# Patient Record
Sex: MALE | Race: WHITE | Employment: OTHER | URBAN - NONMETROPOLITAN AREA
[De-identification: names, ages, dates, MRNs, and addresses within clinical notes are randomized per-mention and may not be internally consistent; named-entity substitution may affect disease eponyms.]

---

## 2020-03-13 NOTE — PROGRESS NOTES
HPI:    Patient is a 77 y.o. male in no acute distress. He is alert and oriented to person, place, and time. Patient is here today for bilateral flank pain. Patient does have a history of prostatitis. It has been a number of years since he has been treated for prostatitis. He currently does not take any medications to help him void. Patient has no recent gross hematuria. Patient reports no nausea or vomiting. No past medical history on file. No past surgical history on file. No outpatient encounter medications on file as of 3/16/2020. No facility-administered encounter medications on file as of 3/16/2020. No current outpatient medications on file prior to visit. No current facility-administered medications on file prior to visit. Patient has no allergy information on record. No family history on file. Social History     Tobacco Use   Smoking Status Not on file       Social History     Substance and Sexual Activity   Alcohol Use Not on file       Review of Systems   Constitutional: Negative for appetite change, chills and fever. Eyes: Negative for pain, redness and visual disturbance. Respiratory: Negative for cough, shortness of breath and wheezing. Cardiovascular: Negative for chest pain and leg swelling. Gastrointestinal: Negative for abdominal pain, nausea and vomiting. Genitourinary: Negative for difficulty urinating, discharge, dysuria, flank pain, frequency, hematuria, scrotal swelling and testicular pain. Musculoskeletal: Negative for back pain, joint swelling and myalgias. Skin: Negative for color change, rash and wound. Neurological: Negative for dizziness, tremors and numbness. Hematological: Negative for adenopathy. Does not bruise/bleed easily. There were no vitals taken for this visit. PHYSICAL EXAM:  Constitutional: Patient in no acute distress; Neuro: alert and oriented to person place and time.     Psych: Mood and affect normal.  Skin: Normal  Lungs: Respiratory effort normal  Cardiovascular:  Normal peripheral pulses  Abdomen: Soft, non-tender, non-distended with no CVA, flank pain, hepatosplenomegaly or hernia. Kidneys normal.  Bladder non-tender and not distended. Lymphatics: no palpable lymphadenopathy  Penis normal  Urethral meatus normal  Scrotal exam normal  Testicles normal bilaterally  Epididymis normal bilaterally  No evidence of inguinal hernia      No results found for: BUN  No results found for: CREATININE  No results found for: PSA    ASSESSMENT:  This is a 77 y.o. male with the following diagnoses:   Diagnosis Orders   1. BPH with obstruction/lower urinary tract symptoms  tamsulosin (FLOMAX) 0.4 MG capsule   2. Flank pain  CT ABDOMEN PELVIS WO CONTRAST    Urinalysis with Microscopic    Culture, Urine   3. Prostatitis, unspecified prostatitis type  doxycycline hyclate (VIBRA-TABS) 100 MG tablet    Urinalysis with Microscopic    Culture, Urine         PLAN:  We will plan for Flomax now. This will be for BPH. We will treat a presumed prostatitis with 3 weeks of doxycycline. We will also get a CT stone protocol to rule out any stone because of his issues. He will follow-up with us in 4 to 6 weeks.

## 2020-03-16 ENCOUNTER — HOSPITAL ENCOUNTER (OUTPATIENT)
Age: 70
Setting detail: SPECIMEN
Discharge: HOME | End: 2020-03-16
Payer: MEDICARE

## 2020-03-16 ENCOUNTER — OFFICE VISIT (OUTPATIENT)
Dept: UROLOGY | Age: 70
End: 2020-03-16
Payer: MEDICARE

## 2020-03-16 VITALS — WEIGHT: 250 LBS

## 2020-03-16 LAB
-: ABNORMAL
AMORPHOUS: ABNORMAL
BACTERIA: ABNORMAL
BILIRUBIN URINE: NEGATIVE
CASTS UA: ABNORMAL /LPF
COLOR: YELLOW
COMMENT UA: ABNORMAL
CRYSTALS, UA: ABNORMAL /HPF
EPITHELIAL CELLS UA: ABNORMAL /HPF (ref 0–5)
GLUCOSE URINE: NEGATIVE
KETONES, URINE: NEGATIVE
LEUKOCYTE ESTERASE, URINE: NEGATIVE
MUCUS: ABNORMAL
NITRITE, URINE: NEGATIVE
OTHER OBSERVATIONS UA: ABNORMAL
PH UA: 6 (ref 5–9)
PROTEIN UA: ABNORMAL
RBC UA: ABNORMAL /HPF (ref 0–2)
RENAL EPITHELIAL, UA: ABNORMAL /HPF
SPECIFIC GRAVITY UA: >1.03 (ref 1.01–1.02)
TRICHOMONAS: ABNORMAL
TURBIDITY: CLEAR
URINE HGB: NEGATIVE
UROBILINOGEN, URINE: NORMAL
WBC UA: ABNORMAL /HPF (ref 0–5)
YEAST: ABNORMAL

## 2020-03-16 PROCEDURE — 81001 URINALYSIS AUTO W/SCOPE: CPT

## 2020-03-16 PROCEDURE — 87086 URINE CULTURE/COLONY COUNT: CPT

## 2020-03-16 PROCEDURE — 1123F ACP DISCUSS/DSCN MKR DOCD: CPT | Performed by: UROLOGY

## 2020-03-16 PROCEDURE — 4040F PNEUMOC VAC/ADMIN/RCVD: CPT | Performed by: UROLOGY

## 2020-03-16 PROCEDURE — 99204 OFFICE O/P NEW MOD 45 MIN: CPT | Performed by: UROLOGY

## 2020-03-16 PROCEDURE — 1036F TOBACCO NON-USER: CPT | Performed by: UROLOGY

## 2020-03-16 PROCEDURE — G8484 FLU IMMUNIZE NO ADMIN: HCPCS | Performed by: UROLOGY

## 2020-03-16 PROCEDURE — G8427 DOCREV CUR MEDS BY ELIG CLIN: HCPCS | Performed by: UROLOGY

## 2020-03-16 PROCEDURE — 99202 OFFICE O/P NEW SF 15 MIN: CPT | Performed by: UROLOGY

## 2020-03-16 PROCEDURE — G8421 BMI NOT CALCULATED: HCPCS | Performed by: UROLOGY

## 2020-03-16 PROCEDURE — 3017F COLORECTAL CA SCREEN DOC REV: CPT | Performed by: UROLOGY

## 2020-03-16 RX ORDER — FAMOTIDINE 40 MG/1
40 TABLET, FILM COATED ORAL DAILY
COMMUNITY
Start: 2019-06-28

## 2020-03-16 RX ORDER — ACETAMINOPHEN 500 MG
500 TABLET ORAL EVERY 6 HOURS PRN
COMMUNITY

## 2020-03-16 RX ORDER — ASPIRIN 81 MG/1
81 TABLET, CHEWABLE ORAL DAILY
COMMUNITY

## 2020-03-16 RX ORDER — COVID-19 ANTIGEN TEST
KIT MISCELLANEOUS DAILY
COMMUNITY
End: 2021-03-29

## 2020-03-16 RX ORDER — DOXYCYCLINE HYCLATE 100 MG
100 TABLET ORAL 2 TIMES DAILY
Qty: 42 TABLET | Refills: 0 | Status: SHIPPED | OUTPATIENT
Start: 2020-03-16 | End: 2020-04-06

## 2020-03-16 RX ORDER — TEMAZEPAM 15 MG/1
15 CAPSULE ORAL NIGHTLY PRN
COMMUNITY
End: 2021-03-29

## 2020-03-16 RX ORDER — MELOXICAM 15 MG/1
15 TABLET ORAL DAILY
COMMUNITY
End: 2021-03-29

## 2020-03-16 RX ORDER — TAMSULOSIN HYDROCHLORIDE 0.4 MG/1
0.4 CAPSULE ORAL EVERY EVENING
Qty: 30 CAPSULE | Refills: 11 | Status: SHIPPED | OUTPATIENT
Start: 2020-03-16 | End: 2020-10-28 | Stop reason: SDUPTHER

## 2020-03-16 RX ORDER — METOPROLOL TARTRATE 100 MG/1
100 TABLET ORAL DAILY
COMMUNITY

## 2020-03-16 ASSESSMENT — ENCOUNTER SYMPTOMS
NAUSEA: 0
VOMITING: 0
EYE PAIN: 0
COLOR CHANGE: 0
COUGH: 0
WHEEZING: 0
BACK PAIN: 0
SHORTNESS OF BREATH: 0
EYE REDNESS: 0
ABDOMINAL PAIN: 0

## 2020-03-17 LAB
CULTURE: NO GROWTH
Lab: NORMAL
SPECIMEN DESCRIPTION: NORMAL

## 2020-03-18 ENCOUNTER — TELEPHONE (OUTPATIENT)
Dept: UROLOGY | Age: 70
End: 2020-03-18

## 2020-03-25 ENCOUNTER — HOSPITAL ENCOUNTER (OUTPATIENT)
Dept: CT IMAGING | Age: 70
Discharge: HOME | End: 2020-03-27
Payer: MEDICARE

## 2020-03-25 PROCEDURE — 74176 CT ABD & PELVIS W/O CONTRAST: CPT

## 2020-04-27 ENCOUNTER — OFFICE VISIT (OUTPATIENT)
Dept: UROLOGY | Age: 70
End: 2020-04-27
Payer: MEDICARE

## 2020-04-27 VITALS
HEART RATE: 81 BPM | SYSTOLIC BLOOD PRESSURE: 147 MMHG | BODY MASS INDEX: 37.33 KG/M2 | DIASTOLIC BLOOD PRESSURE: 91 MMHG | HEIGHT: 69 IN | WEIGHT: 252 LBS

## 2020-04-27 PROCEDURE — 1036F TOBACCO NON-USER: CPT | Performed by: UROLOGY

## 2020-04-27 PROCEDURE — 3017F COLORECTAL CA SCREEN DOC REV: CPT | Performed by: UROLOGY

## 2020-04-27 PROCEDURE — 99213 OFFICE O/P EST LOW 20 MIN: CPT | Performed by: UROLOGY

## 2020-04-27 PROCEDURE — 4040F PNEUMOC VAC/ADMIN/RCVD: CPT | Performed by: UROLOGY

## 2020-04-27 PROCEDURE — 99211 OFF/OP EST MAY X REQ PHY/QHP: CPT | Performed by: UROLOGY

## 2020-04-27 PROCEDURE — 1123F ACP DISCUSS/DSCN MKR DOCD: CPT | Performed by: UROLOGY

## 2020-04-27 PROCEDURE — G8417 CALC BMI ABV UP PARAM F/U: HCPCS | Performed by: UROLOGY

## 2020-04-27 PROCEDURE — G8427 DOCREV CUR MEDS BY ELIG CLIN: HCPCS | Performed by: UROLOGY

## 2020-04-27 ASSESSMENT — ENCOUNTER SYMPTOMS
EYE PAIN: 0
COLOR CHANGE: 0
BACK PAIN: 0
WHEEZING: 0
EYE REDNESS: 0
VOMITING: 0
NAUSEA: 0
SHORTNESS OF BREATH: 0
COUGH: 0
ABDOMINAL PAIN: 0

## 2020-04-27 NOTE — PROGRESS NOTES
HPI:    Patient is a 77 y.o. male in no acute distress. He is alert and oriented to person, place, and time. History  Patient is here today for bilateral flank pain. Patient does have a history of prostatitis. It has been a number of years since he has been treated for prostatitis. He currently does not take any medications to help him void. Patient has no recent gross hematuria. Patient reports no nausea or vomiting.     Currently  Patient is currently here for 6-week follow-up. Patient did get a recent CT scan. This was independently reviewed today. Patient does have 3 small stones in his left kidney. Otherwise no significant  abnormalities. Past Medical History:   Diagnosis Date    COPD [facility]chronic obstructive pulmonary disease) ([facility] [address]     Hypertension      Past Surgical History:   Procedure Laterality Date    CARDIAC SURGERY  2019    triple bipass    HERNIA REPAIR      NECK SURGERY      VASECTOMY       Outpatient Encounter Medications as of 4/27/2020   Medication Sig Dispense Refill    aspirin 81 MG chewable tablet Take 81 mg by mouth daily      famotidine (PEPCID) 40 MG tablet Take 40 mg by mouth daily      meloxicam (MOBIC) 15 MG tablet Take 15 mg by mouth daily      metoprolol (LOPRESSOR) 100 MG tablet Take 100 mg by mouth 2 times daily      temazepam (RESTORIL) 15 MG capsule Take 15 mg by mouth nightly as needed.  acetaminophen (TYLENOL) 500 MG tablet Take 500 mg by mouth every 6 hours as needed      Naproxen Sodium 220 MG CAPS Take by mouth daily Take 2 tablets daily      Vitamin E 180 MG CAPS Take by mouth daily Take 2 tablets daily      Multiple Vitamins-Minerals (MULTIVITAL PO) Take by mouth daily      Omega-3 Fatty Acids (FISH OIL PO) Take 1,000 mg by mouth daily      tamsulosin (FLOMAX) 0.4 MG capsule Take 1 capsule by mouth every evening 30 capsule 11     No facility-administered encounter medications on file as of 4/27/2020.        Current Outpatient Medications on File Prior to Visit   Medication Sig Dispense Refill    aspirin 81 MG chewable tablet Take 81 mg by mouth daily      famotidine (PEPCID) 40 MG tablet Take 40 mg by mouth daily      meloxicam (MOBIC) 15 MG tablet Take 15 mg by mouth daily      metoprolol (LOPRESSOR) 100 MG tablet Take 100 mg by mouth 2 times daily      temazepam (RESTORIL) 15 MG capsule Take 15 mg by mouth nightly as needed.  acetaminophen (TYLENOL) 500 MG tablet Take 500 mg by mouth every 6 hours as needed      Naproxen Sodium 220 MG CAPS Take by mouth daily Take 2 tablets daily      Vitamin E 180 MG CAPS Take by mouth daily Take 2 tablets daily      Multiple Vitamins-Minerals (MULTIVITAL PO) Take by mouth daily      Omega-3 Fatty Acids (FISH OIL PO) Take 1,000 mg by mouth daily      tamsulosin (FLOMAX) 0.4 MG capsule Take 1 capsule by mouth every evening 30 capsule 11     No current facility-administered medications on file prior to visit. Patient has no known allergies. Family History   Problem Relation Age of Onset    Cancer Mother      Social History     Tobacco Use   Smoking Status Former Smoker   Smokeless Tobacco Never Used       Social History     Substance and Sexual Activity   Alcohol Use Yes    Comment: occasional       Review of Systems   Constitutional: Negative for appetite change, chills and fever. Eyes: Negative for pain, redness and visual disturbance. Respiratory: Negative for cough, shortness of breath and wheezing. Cardiovascular: Negative for chest pain and leg swelling. Gastrointestinal: Negative for abdominal pain, nausea and vomiting. Genitourinary: Negative for difficulty urinating, discharge, dysuria, flank pain, frequency, hematuria, scrotal swelling and testicular pain. Musculoskeletal: Negative for back pain, joint swelling and myalgias. Skin: Negative for color change, rash and wound. Neurological: Negative for dizziness, tremors and numbness.    Hematological: Negative for

## 2020-10-27 ENCOUNTER — HOSPITAL ENCOUNTER (OUTPATIENT)
Dept: GENERAL RADIOLOGY | Age: 70
Discharge: HOME | End: 2020-10-29
Payer: MEDICARE

## 2020-10-27 ENCOUNTER — HOSPITAL ENCOUNTER (OUTPATIENT)
Age: 70
Discharge: HOME | End: 2020-10-29
Payer: MEDICARE

## 2020-10-27 PROCEDURE — 74018 RADEX ABDOMEN 1 VIEW: CPT

## 2020-10-28 ENCOUNTER — OFFICE VISIT (OUTPATIENT)
Dept: UROLOGY | Age: 70
End: 2020-10-28
Payer: MEDICARE

## 2020-10-28 VITALS
BODY MASS INDEX: 36.62 KG/M2 | DIASTOLIC BLOOD PRESSURE: 89 MMHG | SYSTOLIC BLOOD PRESSURE: 136 MMHG | WEIGHT: 248 LBS | HEART RATE: 81 BPM

## 2020-10-28 PROCEDURE — 4040F PNEUMOC VAC/ADMIN/RCVD: CPT | Performed by: PHYSICIAN ASSISTANT

## 2020-10-28 PROCEDURE — 1036F TOBACCO NON-USER: CPT | Performed by: PHYSICIAN ASSISTANT

## 2020-10-28 PROCEDURE — G8417 CALC BMI ABV UP PARAM F/U: HCPCS | Performed by: PHYSICIAN ASSISTANT

## 2020-10-28 PROCEDURE — 1123F ACP DISCUSS/DSCN MKR DOCD: CPT | Performed by: PHYSICIAN ASSISTANT

## 2020-10-28 PROCEDURE — G8427 DOCREV CUR MEDS BY ELIG CLIN: HCPCS | Performed by: PHYSICIAN ASSISTANT

## 2020-10-28 PROCEDURE — 51798 US URINE CAPACITY MEASURE: CPT

## 2020-10-28 PROCEDURE — 99213 OFFICE O/P EST LOW 20 MIN: CPT | Performed by: PHYSICIAN ASSISTANT

## 2020-10-28 PROCEDURE — G8484 FLU IMMUNIZE NO ADMIN: HCPCS | Performed by: PHYSICIAN ASSISTANT

## 2020-10-28 PROCEDURE — 3017F COLORECTAL CA SCREEN DOC REV: CPT | Performed by: PHYSICIAN ASSISTANT

## 2020-10-28 RX ORDER — LANOLIN ALCOHOL/MO/W.PET/CERES
1000 CREAM (GRAM) TOPICAL DAILY
COMMUNITY

## 2020-10-28 RX ORDER — FINASTERIDE 1 MG/1
1 TABLET, FILM COATED ORAL DAILY
COMMUNITY

## 2020-10-28 RX ORDER — DIPHENHYDRAMINE HCL 50 MG
50 CAPSULE ORAL EVERY 6 HOURS PRN
COMMUNITY

## 2020-10-28 RX ORDER — PHENOL 1.4 %
AEROSOL, SPRAY (ML) MUCOUS MEMBRANE PRN
COMMUNITY

## 2020-10-28 RX ORDER — TAMSULOSIN HYDROCHLORIDE 0.4 MG/1
0.4 CAPSULE ORAL EVERY EVENING
Qty: 90 CAPSULE | Refills: 3 | Status: SHIPPED | OUTPATIENT
Start: 2020-10-28 | End: 2021-12-07

## 2020-10-28 RX ORDER — LANOLIN ALCOHOL/MO/W.PET/CERES
1 CREAM (GRAM) TOPICAL 2 TIMES DAILY
COMMUNITY

## 2020-10-28 RX ORDER — ROSUVASTATIN CALCIUM 20 MG/1
10 TABLET, COATED ORAL DAILY
COMMUNITY
Start: 2020-10-15 | End: 2021-03-29

## 2020-10-28 RX ORDER — CELECOXIB 200 MG/1
200 CAPSULE ORAL DAILY
COMMUNITY

## 2020-10-28 ASSESSMENT — ENCOUNTER SYMPTOMS
CONSTIPATION: 0
ABDOMINAL PAIN: 0
NAUSEA: 0
SHORTNESS OF BREATH: 0
WHEEZING: 0
VOMITING: 0
EYE REDNESS: 0
COLOR CHANGE: 0
BACK PAIN: 0
EYE PAIN: 0
COUGH: 0

## 2020-10-28 NOTE — PROGRESS NOTES
daily      famotidine (PEPCID) 40 MG tablet Take 40 mg by mouth daily      metoprolol (LOPRESSOR) 100 MG tablet Take 100 mg by mouth 2 times daily      temazepam (RESTORIL) 15 MG capsule Take 15 mg by mouth nightly as needed.  acetaminophen (TYLENOL) 500 MG tablet Take 500 mg by mouth every 6 hours as needed      Vitamin E 180 MG CAPS Take by mouth daily Take 2 tablets daily      Multiple Vitamins-Minerals (MULTIVITAL PO) Take by mouth daily      Omega-3 Fatty Acids (FISH OIL PO) Take 1,000 mg by mouth daily      meloxicam (MOBIC) 15 MG tablet Take 15 mg by mouth daily      Naproxen Sodium 220 MG CAPS Take by mouth daily Take 2 tablets daily      [DISCONTINUED] tamsulosin (FLOMAX) 0.4 MG capsule Take 1 capsule by mouth every evening 30 capsule 11     No facility-administered encounter medications on file as of 10/28/2020. Current Outpatient Medications on File Prior to Visit   Medication Sig Dispense Refill    Melatonin 10 MG TABS Take by mouth      diphenhydrAMINE (BENADRYL) 50 MG capsule Take 50 mg by mouth every 6 hours as needed for Itching      vitamin B-12 (CYANOCOBALAMIN) 1000 MCG tablet Take 1,000 mcg by mouth daily      NONFORMULARY Take 500 mg by mouth 3 times daily Valerian root      celecoxib (CELEBREX) 200 MG capsule Take 200 mg by mouth daily      finasteride (PROPECIA) 1 MG tablet Take 1 mg by mouth daily      glucosamine-chondroitin 500-400 MG tablet Take 1 tablet by mouth 2 times daily      rosuvastatin (CRESTOR) 20 MG tablet Take 10 mg by mouth daily      aspirin 81 MG chewable tablet Take 81 mg by mouth daily      famotidine (PEPCID) 40 MG tablet Take 40 mg by mouth daily      metoprolol (LOPRESSOR) 100 MG tablet Take 100 mg by mouth 2 times daily      temazepam (RESTORIL) 15 MG capsule Take 15 mg by mouth nightly as needed.       acetaminophen (TYLENOL) 500 MG tablet Take 500 mg by mouth every 6 hours as needed      Vitamin E 180 MG CAPS Take by mouth daily Take 2 tablets daily      Multiple Vitamins-Minerals (MULTIVITAL PO) Take by mouth daily      Omega-3 Fatty Acids (FISH OIL PO) Take 1,000 mg by mouth daily      meloxicam (MOBIC) 15 MG tablet Take 15 mg by mouth daily      Naproxen Sodium 220 MG CAPS Take by mouth daily Take 2 tablets daily       No current facility-administered medications on file prior to visit. Atorvastatin  Family History   Problem Relation Age of Onset    Cancer Mother      Social History     Tobacco Use   Smoking Status Former Smoker   Smokeless Tobacco Never Used       Social History     Substance and Sexual Activity   Alcohol Use Yes    Comment: occasional       Review of Systems   Constitutional: Negative for appetite change, chills and fever. Eyes: Negative for pain, redness and visual disturbance. Respiratory: Negative for cough, shortness of breath and wheezing. Cardiovascular: Negative for chest pain and leg swelling. Gastrointestinal: Negative for abdominal pain, constipation, nausea and vomiting. Genitourinary: Negative for decreased urine volume, difficulty urinating, discharge, dysuria, enuresis, flank pain, frequency, hematuria, penile pain, scrotal swelling, testicular pain and urgency. Musculoskeletal: Negative for back pain, joint swelling and myalgias. Skin: Negative for color change, rash and wound. Neurological: Negative for dizziness, tremors and numbness. Hematological: Negative for adenopathy. Does not bruise/bleed easily. /89 (Site: Right Upper Arm, Position: Sitting, Cuff Size: Large Adult)   Pulse 81   Wt 248 lb (112.5 kg)   BMI 36.62 kg/m²       PHYSICAL EXAM:  Constitutional: Patient in no acute distress; Neuro: alert and oriented to person place and time. Psych: Mood and affect normal.  Skin: Normal  Lungs: Respiratory effort normal  Cardiovascular:  Normal peripheral pulses  Abdomen: Soft, non-tender, non-distended with no CVA, flank pain, hepatosplenomegaly or hernia. Kidneys normal.  Bladder non-tender and not distended. Lymphatics: no palpable lymphadenopathy  Rectal:deferred       No results found for: BUN  No results found for: CREATININE  No results found for: PSA    ASSESSMENT:   Diagnosis Orders   1. Kidney stones  XR ABDOMEN (KUB) (SINGLE AP VIEW)   2. BPH with obstruction/lower urinary tract symptoms  tamsulosin (FLOMAX) 0.4 MG capsule     PLAN:  KUB in 1 year    Continue Flomax    STONE PREVENTION    To prevent future stone formation:    1) DO drink ~65-80 oz (2-2.5L) of water per day to stay adequately hydrated     2) AVOID/LIMIT intake of \"bad fluids\": soda/pop, coffee, tea, alcohol, energy drinks, any beverage with caffeine can act to dehydrate you       \"BAD FLUIDS\" DO NOT COUNT TOWARD THE 65-80oz of water    3) REDUCE consumption of sodium/salt - DO NOT salt your food, read labels (lunch meats, canned soups, prepared meals are high in salt), choose low salt options    4) DO NOT EAT animal protein/meat more than 2 meals a day - this includes red meat, pork, poultry and fish    Patient will follow-up in 1 year with KUB and PVR. He will contact us sooner if he has any new or worsening urinary tract symptoms.

## 2021-03-29 ENCOUNTER — OFFICE VISIT (OUTPATIENT)
Dept: UROLOGY | Age: 71
End: 2021-03-29
Payer: MEDICARE

## 2021-03-29 VITALS
TEMPERATURE: 98.6 F | WEIGHT: 242.6 LBS | BODY MASS INDEX: 35.93 KG/M2 | SYSTOLIC BLOOD PRESSURE: 131 MMHG | HEART RATE: 81 BPM | DIASTOLIC BLOOD PRESSURE: 83 MMHG | HEIGHT: 69 IN

## 2021-03-29 DIAGNOSIS — N52.9 ERECTILE DYSFUNCTION, UNSPECIFIED ERECTILE DYSFUNCTION TYPE: ICD-10-CM

## 2021-03-29 PROCEDURE — G8484 FLU IMMUNIZE NO ADMIN: HCPCS | Performed by: PHYSICIAN ASSISTANT

## 2021-03-29 PROCEDURE — 99214 OFFICE O/P EST MOD 30 MIN: CPT | Performed by: PHYSICIAN ASSISTANT

## 2021-03-29 PROCEDURE — 99211 OFF/OP EST MAY X REQ PHY/QHP: CPT | Performed by: PHYSICIAN ASSISTANT

## 2021-03-29 PROCEDURE — G8427 DOCREV CUR MEDS BY ELIG CLIN: HCPCS | Performed by: PHYSICIAN ASSISTANT

## 2021-03-29 PROCEDURE — 4040F PNEUMOC VAC/ADMIN/RCVD: CPT | Performed by: PHYSICIAN ASSISTANT

## 2021-03-29 PROCEDURE — 1036F TOBACCO NON-USER: CPT | Performed by: PHYSICIAN ASSISTANT

## 2021-03-29 PROCEDURE — 1123F ACP DISCUSS/DSCN MKR DOCD: CPT | Performed by: PHYSICIAN ASSISTANT

## 2021-03-29 PROCEDURE — G8417 CALC BMI ABV UP PARAM F/U: HCPCS | Performed by: PHYSICIAN ASSISTANT

## 2021-03-29 PROCEDURE — 3017F COLORECTAL CA SCREEN DOC REV: CPT | Performed by: PHYSICIAN ASSISTANT

## 2021-03-29 RX ORDER — GABAPENTIN 100 MG/1
100 CAPSULE ORAL 3 TIMES DAILY
COMMUNITY
Start: 2021-03-11

## 2021-03-29 RX ORDER — TIZANIDINE 2 MG/1
TABLET ORAL DAILY
COMMUNITY
Start: 2021-03-11

## 2021-03-29 RX ORDER — HYDROCHLOROTHIAZIDE 12.5 MG/1
12.5 CAPSULE, GELATIN COATED ORAL EVERY MORNING
COMMUNITY
Start: 2021-02-11

## 2021-03-29 RX ORDER — PRAVASTATIN SODIUM 40 MG
40 TABLET ORAL DAILY
COMMUNITY
Start: 2021-01-20

## 2021-03-29 RX ORDER — METOPROLOL SUCCINATE 100 MG/1
TABLET, EXTENDED RELEASE ORAL
COMMUNITY
Start: 2021-01-01

## 2021-03-29 ASSESSMENT — ENCOUNTER SYMPTOMS
NAUSEA: 0
BACK PAIN: 0
SHORTNESS OF BREATH: 0
CONSTIPATION: 0
COUGH: 0
WHEEZING: 0
VOMITING: 0
ABDOMINAL PAIN: 0
COLOR CHANGE: 0
EYE REDNESS: 0

## 2021-03-29 NOTE — PROGRESS NOTES
HPI:      Patient is a 72 y.o. male in no acute distress. He is alert and oriented to person, place, and time. History  Patient is here today for bilateral flank pain. Patient does have a history of prostatitis. It has been a number of years since he has been treated for prostatitis. He currently does not take any medications to help him void. Patient has no recent gross hematuria. Patient reports no nausea or vomiting.     Today:  Patient is here to discuss erectile dysfunction. He denies any fever, chills, gross hematuria, flank pain, dysuria. He denies any chest pain, shortness of breath, nausea, vomiting, diarrhea. Patient does have a cardiac history. He does follow with cardiology. 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 He does go to [employer] and walks on a treadmill 4-5 times a week. Patient does have COPD and JASON (patient was unable to tolerate CPAP mask as it caused him to have a rash and swelling of the face). He is a prior smoker and is morbidly obese. Denies any history of diabetes.     Past Medical History:   Diagnosis Date    [facility]chronic obstructive pulmonary disease) ([facility] [address]     Hypertension      Past Surgical History:   Procedure Laterality Date    CARDIAC SURGERY  2019    triple bipass    HERNIA REPAIR      NECK SURGERY      VASECTOMY       Outpatient Encounter Medications as of 3/29/2021   Medication Sig Dispense Refill    gabapentin (NEURONTIN) 100 MG capsule       hydroCHLOROthiazide (MICROZIDE) 12.5 MG capsule       metoprolol succinate (TOPROL XL) 100 MG extended release tablet       pravastatin (PRAVACHOL) 40 MG tablet       tiZANidine (ZANAFLEX) 2 MG tablet       Melatonin 10 MG TABS Take by mouth      diphenhydrAMINE (BENADRYL) 50 MG capsule Take 50 mg by mouth every 6 hours as needed for Itching      vitamin B-12 (CYANOCOBALAMIN) 1000 MCG tablet Take 1,000 mcg by mouth daily      celecoxib (CELEBREX) 200 MG capsule Take 200 mg by mouth by mouth 2 times daily      tamsulosin (FLOMAX) 0.4 MG capsule Take 1 capsule by mouth every evening 90 capsule 3    aspirin 81 MG chewable tablet Take 81 mg by mouth daily      famotidine (PEPCID) 40 MG tablet Take 40 mg by mouth daily      acetaminophen (TYLENOL) 500 MG tablet Take 500 mg by mouth every 6 hours as needed      Vitamin E 180 MG CAPS Take by mouth daily Take 2 tablets daily      Multiple Vitamins-Minerals (MULTIVITAL PO) Take by mouth daily      Omega-3 Fatty Acids (FISH OIL PO) Take 1,000 mg by mouth daily      NONFORMULARY Take 500 mg by mouth 3 times daily Valerian root      rosuvastatin (CRESTOR) 20 MG tablet Take 10 mg by mouth daily      meloxicam (MOBIC) 15 MG tablet Take 15 mg by mouth daily      metoprolol (LOPRESSOR) 100 MG tablet Take 100 mg by mouth 2 times daily      temazepam (RESTORIL) 15 MG capsule Take 15 mg by mouth nightly as needed.  Naproxen Sodium 220 MG CAPS Take by mouth daily Take 2 tablets daily       No current facility-administered medications on file prior to visit. Atorvastatin  Family History   Problem Relation Age of Onset    Cancer Mother      Social History     Tobacco Use   Smoking Status Former Smoker    Packs/day: 1.00    Years: 50.00    Pack years: 50.00    Types: Cigarettes    Quit date:     Years since quittin.2   Smokeless Tobacco Former User    Types: Snuff    Quit date:        Social History     Substance and Sexual Activity   Alcohol Use Yes    Comment: occasional       Review of Systems   Constitutional: Negative for appetite change, chills and fever. Eyes: Negative for redness and visual disturbance. Respiratory: Negative for cough, shortness of breath and wheezing. Cardiovascular: Negative for chest pain and leg swelling. Gastrointestinal: Negative for abdominal pain, constipation, nausea and vomiting.    Genitourinary: Negative for decreased urine volume, difficulty urinating, discharge, dysuria, enuresis, flank pain, frequency, hematuria, penile pain, scrotal swelling, testicular pain and urgency. Positive for erectile dysfunction   Musculoskeletal: Negative for back pain, joint swelling and myalgias. Skin: Negative for color change, rash and wound. Neurological: Negative for dizziness, tremors and numbness. Hematological: Negative for adenopathy. Does not bruise/bleed easily. Temp 98.6 °F (37 °C) (Temporal)   Ht 5' 9\" (1.753 m)   Wt 242 lb 9.6 oz (110 kg)   BMI 35.83 kg/m²       PHYSICAL EXAM:  Constitutional: Patient in no acute distress; Neuro: alert and oriented to person place and time. Psych: Mood and affect normal.  Lungs: Respiratory effort normal  Abdomen: Soft, non-tender, non-distended  Rectal: deferred     No results found for: BUN  No results found for: CREATININE  No results found for: PSA    ASSESSMENT:   Diagnosis Orders   1. Erectile dysfunction, unspecified erectile dysfunction type           PLAN:  Continue Flomax    I had a long discussion about causes of ED including age, co-morbidities, and current medications. He was encouraged to exercise daily to improve erectile function; walking 30 minutes per day at a pace where it is difficult to maintain a conversation. We discussed the various erectile dysfunction (ED) treatment options includin. Oral medical therapy  2. Vacuum constriction device  3. Intracorporeal injection therapy  4.   Penile prosthesis surgical implantation    Risks/benefits were discussed of each and at this time the patient prefers CIALIS    Prior to starting him on any medications we need to find out from his cardiologist whether or not it is safe for him to participate in sexual intercourse and start situational Cialis    If we receive clearance from the cardiologist we can start him on situational Cialis and follow-up with him in the office 2 months after starting that medication

## 2021-04-01 ENCOUNTER — TELEPHONE (OUTPATIENT)
Dept: UROLOGY | Age: 71
End: 2021-04-01

## 2021-04-01 RX ORDER — TADALAFIL 20 MG/1
TABLET ORAL
Qty: 30 TABLET | Refills: 1 | Status: SHIPPED | OUTPATIENT
Start: 2021-04-01 | End: 2022-02-10 | Stop reason: SDUPTHER

## 2021-04-01 NOTE — TELEPHONE ENCOUNTER
We did review patient's cardiology note which does approve him for sexual activity and use of PDE 5 inhibitors.     As thoroughly discussed in office we will send him in situational Cialis    Follow-up in 6 to 8 weeks to discuss efficacy

## 2021-05-18 ENCOUNTER — OFFICE VISIT (OUTPATIENT)
Dept: UROLOGY | Age: 71
End: 2021-05-18
Payer: MEDICARE

## 2021-05-18 VITALS
SYSTOLIC BLOOD PRESSURE: 133 MMHG | DIASTOLIC BLOOD PRESSURE: 79 MMHG | WEIGHT: 231 LBS | HEIGHT: 69 IN | BODY MASS INDEX: 34.21 KG/M2 | TEMPERATURE: 98 F | HEART RATE: 89 BPM

## 2021-05-18 DIAGNOSIS — N13.8 BPH WITH OBSTRUCTION/LOWER URINARY TRACT SYMPTOMS: ICD-10-CM

## 2021-05-18 DIAGNOSIS — N40.1 BPH WITH OBSTRUCTION/LOWER URINARY TRACT SYMPTOMS: ICD-10-CM

## 2021-05-18 DIAGNOSIS — N20.0 KIDNEY STONES: ICD-10-CM

## 2021-05-18 DIAGNOSIS — N52.9 ERECTILE DYSFUNCTION, UNSPECIFIED ERECTILE DYSFUNCTION TYPE: Primary | ICD-10-CM

## 2021-05-18 PROCEDURE — 99211 OFF/OP EST MAY X REQ PHY/QHP: CPT

## 2021-05-18 PROCEDURE — 1123F ACP DISCUSS/DSCN MKR DOCD: CPT | Performed by: PHYSICIAN ASSISTANT

## 2021-05-18 PROCEDURE — 4040F PNEUMOC VAC/ADMIN/RCVD: CPT | Performed by: PHYSICIAN ASSISTANT

## 2021-05-18 PROCEDURE — 3017F COLORECTAL CA SCREEN DOC REV: CPT | Performed by: PHYSICIAN ASSISTANT

## 2021-05-18 PROCEDURE — G8427 DOCREV CUR MEDS BY ELIG CLIN: HCPCS | Performed by: PHYSICIAN ASSISTANT

## 2021-05-18 PROCEDURE — 99213 OFFICE O/P EST LOW 20 MIN: CPT | Performed by: PHYSICIAN ASSISTANT

## 2021-05-18 PROCEDURE — 4004F PT TOBACCO SCREEN RCVD TLK: CPT | Performed by: PHYSICIAN ASSISTANT

## 2021-05-18 PROCEDURE — G8417 CALC BMI ABV UP PARAM F/U: HCPCS | Performed by: PHYSICIAN ASSISTANT

## 2021-05-18 ASSESSMENT — ENCOUNTER SYMPTOMS
ABDOMINAL PAIN: 0
BACK PAIN: 0
COLOR CHANGE: 0
CONSTIPATION: 0
WHEEZING: 0
SHORTNESS OF BREATH: 0
VOMITING: 0
COUGH: 0
EYE REDNESS: 0
NAUSEA: 0

## 2021-05-18 NOTE — PROGRESS NOTES
HPI:      Patient is a 72 y.o. male in no acute distress. He is alert and oriented to person, place, and time. History  Patient is here today for bilateral flank pain. Patient does have a history of prostatitis. It has been a number of years since he has been treated for prostatitis. He currently does not take any medications to help him void. Patient has no recent gross hematuria. Patient reports no nausea or vomiting. We did receive cardiac clearance for him to participate in sexual intercourse and to start PD5 inhibitor     Today:  Patient is here to discuss erectile dysfunction. Patient did receive cardiac clearance from his cardiologist for erectile dysfunction medications as well as sexual intercourse. Patient was started on Cialis 20 mg at situational dosing. Patient states he is able to achieve and maintain erection and is very happy with the results. He denies any chest pain, shortness of breath, dyspnea on exertion. He denies any erections lasting more than 4 hours. He has not passed any kidney stones or had any gross hematuria since last visit. He continues to take Flomax for BPH. Past Medical History:   Diagnosis Date    [facility] obstructive [facility] disease) ([facility] [address]     Hypertension      Past Surgical History:   Procedure Laterality Date    CARDIAC SURGERY  2019    triple bipass    HERNIA REPAIR      NECK SURGERY      VASECTOMY       Outpatient Encounter Medications as of 5/18/2021   Medication Sig Dispense Refill    tadalafil (CIALIS) 20 MG tablet Take 1 tab by mouth daily as needed for ED, take on empty stomach at least 1/2-hour before activity. Do not exceed 1 tab 30 tablet 1    gabapentin (NEURONTIN) 100 MG capsule Take 100 mg by mouth 3 times daily.  Take 2 tablets three times a day      hydroCHLOROthiazide (MICROZIDE) 12.5 MG capsule Take 12.5 mg by mouth every morning       metoprolol succinate (TOPROL XL) 100 MG extended release tablet       pravastatin (PRAVACHOL) 40 MG tablet Take 40 mg by mouth daily       tiZANidine (ZANAFLEX) 2 MG tablet daily       Melatonin 10 MG TABS Take by mouth as needed       diphenhydrAMINE (BENADRYL) 50 MG capsule Take 50 mg by mouth every 6 hours as needed for Itching      vitamin B-12 (CYANOCOBALAMIN) 1000 MCG tablet Take 1,000 mcg by mouth daily      celecoxib (CELEBREX) 200 MG capsule Take 200 mg by mouth daily      finasteride (PROPECIA) 1 MG tablet Take 1 mg by mouth daily      glucosamine-chondroitin 500-400 MG tablet Take 1 tablet by mouth 2 times daily      tamsulosin (FLOMAX) 0.4 MG capsule Take 1 capsule by mouth every evening 90 capsule 3    aspirin 81 MG chewable tablet Take 81 mg by mouth daily      famotidine (PEPCID) 40 MG tablet Take 40 mg by mouth daily      metoprolol (LOPRESSOR) 100 MG tablet Take 100 mg by mouth daily       acetaminophen (TYLENOL) 500 MG tablet Take 500 mg by mouth every 6 hours as needed      Vitamin E 180 MG CAPS Take by mouth daily Take 2 tablets daily      Multiple Vitamins-Minerals (MULTIVITAL PO) Take by mouth daily      Omega-3 Fatty Acids (FISH OIL PO) Take 1,000 mg by mouth daily       No facility-administered encounter medications on file as of 5/18/2021. Current Outpatient Medications on File Prior to Visit   Medication Sig Dispense Refill    tadalafil (CIALIS) 20 MG tablet Take 1 tab by mouth daily as needed for ED, take on empty stomach at least 1/2-hour before activity. Do not exceed 1 tab 30 tablet 1    gabapentin (NEURONTIN) 100 MG capsule Take 100 mg by mouth 3 times daily.  Take 2 tablets three times a day      hydroCHLOROthiazide (MICROZIDE) 12.5 MG capsule Take 12.5 mg by mouth every morning       metoprolol succinate (TOPROL XL) 100 MG extended release tablet       pravastatin (PRAVACHOL) 40 MG tablet Take 40 mg by mouth daily       tiZANidine (ZANAFLEX) 2 MG tablet daily       Melatonin 10 MG TABS Take by mouth as needed       frequency, hematuria, penile pain, scrotal swelling, testicular pain and urgency. Musculoskeletal: Negative for back pain, joint swelling and myalgias. Skin: Negative for color change, rash and wound. Neurological: Negative for dizziness, tremors and numbness. Hematological: Negative for adenopathy. Does not bruise/bleed easily. /79 (Site: Right Upper Arm, Position: Sitting, Cuff Size: Large Adult)   Pulse 89   Temp 98 °F (36.7 °C)   Ht 5' 9\" (1.753 m)   Wt 231 lb (104.8 kg)   BMI 34.11 kg/m²       PHYSICAL EXAM:  Constitutional: Patient in no acute distress; Neuro: alert and oriented to person place and time. Psych: Mood and affect normal.  Lungs: Respiratory effort normal  Abdomen: Soft, non-tender, non-distended  Rectal: deferred       No results found for: BUN  No results found for: CREATININE  No results found for: PSA    ASSESSMENT:   Diagnosis Orders   1. Erectile dysfunction, unspecified erectile dysfunction type     2. Kidney stones     3. BPH with obstruction/lower urinary tract symptoms           PLAN:  Continue Cialis 20 mg situational dosing, he is aware that if he develops an erection lasting more than 4 hours he is to go to the emergency room. Continue Flomax    Stone follow-up in October KUB    We will also do a BPH follow-up at that time. Follow-up 10/2021 with KUB and PVR    If patient looks good from BPH stone in the ED standpoint after next appointment we will change him to yearly follow-up.

## 2021-07-08 ENCOUNTER — HOSPITAL ENCOUNTER (EMERGENCY)
Age: 71
Discharge: LEFT BEFORE DISCHARGE | End: 2021-07-08
Payer: MEDICARE

## 2021-07-08 VITALS
DIASTOLIC BLOOD PRESSURE: 88 MMHG | TEMPERATURE: 98.7 F | HEART RATE: 85 BPM | SYSTOLIC BLOOD PRESSURE: 158 MMHG | RESPIRATION RATE: 16 BRPM | OXYGEN SATURATION: 96 %

## 2021-07-09 NOTE — ED NOTES
Writer entered room to re-evlauate patient. Writer asked patient if he felt worse or the same. Patient stated \"Im about to leave\" while removing his bp cuff and pulse ox. Writer asked if there was a reason that patient wanted to leave. Patient stated that he had been here long enough. Patient walked out of ER.       [address], RN  07/08/21 [id number]

## 2021-07-09 NOTE — ED NOTES
Writer entered room to reevaluate patient. Asked patient if he felt any worse or the same. Patient became angry and stated \"I'm about to go home\" as he has removing bp cuff and pulse ox. Writer asked if there was a reason why he was leaving. Patient stated \"yea I have been here long enough\". Patient proceeded to walk out of ER.       [address], RN  07/08/21 2407

## 2021-11-08 ENCOUNTER — TELEPHONE (OUTPATIENT)
Dept: UROLOGY | Age: 71
End: 2021-11-08

## 2021-11-08 DIAGNOSIS — N20.0 KIDNEY STONES: Primary | ICD-10-CM

## 2021-11-08 NOTE — TELEPHONE ENCOUNTER
Patient called and rescheduled his appointment that he had missed with KUB prior. Need new order for KUB - previous order .

## 2021-11-11 ENCOUNTER — HOSPITAL ENCOUNTER (OUTPATIENT)
Age: 71
Discharge: HOME | End: 2021-11-13
Payer: MEDICARE

## 2021-11-11 ENCOUNTER — HOSPITAL ENCOUNTER (OUTPATIENT)
Dept: GENERAL RADIOLOGY | Age: 71
Discharge: HOME | End: 2021-11-13
Payer: MEDICARE

## 2021-11-11 DIAGNOSIS — N20.0 KIDNEY STONES: ICD-10-CM

## 2021-11-11 PROCEDURE — 74018 RADEX ABDOMEN 1 VIEW: CPT

## 2021-11-23 ENCOUNTER — OFFICE VISIT (OUTPATIENT)
Dept: UROLOGY | Age: 71
End: 2021-11-23
Payer: MEDICARE

## 2021-11-23 VITALS
HEIGHT: 69 IN | DIASTOLIC BLOOD PRESSURE: 78 MMHG | SYSTOLIC BLOOD PRESSURE: 143 MMHG | BODY MASS INDEX: 34.21 KG/M2 | TEMPERATURE: 97.9 F | WEIGHT: 231 LBS

## 2021-11-23 DIAGNOSIS — N20.0 KIDNEY STONE: Primary | ICD-10-CM

## 2021-11-23 DIAGNOSIS — N40.1 BPH WITH OBSTRUCTION/LOWER URINARY TRACT SYMPTOMS: ICD-10-CM

## 2021-11-23 DIAGNOSIS — N52.9 ERECTILE DYSFUNCTION, UNSPECIFIED ERECTILE DYSFUNCTION TYPE: ICD-10-CM

## 2021-11-23 DIAGNOSIS — N13.8 BPH WITH OBSTRUCTION/LOWER URINARY TRACT SYMPTOMS: ICD-10-CM

## 2021-11-23 PROCEDURE — 4040F PNEUMOC VAC/ADMIN/RCVD: CPT | Performed by: PHYSICIAN ASSISTANT

## 2021-11-23 PROCEDURE — G8427 DOCREV CUR MEDS BY ELIG CLIN: HCPCS | Performed by: PHYSICIAN ASSISTANT

## 2021-11-23 PROCEDURE — 4004F PT TOBACCO SCREEN RCVD TLK: CPT | Performed by: PHYSICIAN ASSISTANT

## 2021-11-23 PROCEDURE — G8484 FLU IMMUNIZE NO ADMIN: HCPCS | Performed by: PHYSICIAN ASSISTANT

## 2021-11-23 PROCEDURE — 1123F ACP DISCUSS/DSCN MKR DOCD: CPT | Performed by: PHYSICIAN ASSISTANT

## 2021-11-23 PROCEDURE — G8417 CALC BMI ABV UP PARAM F/U: HCPCS | Performed by: PHYSICIAN ASSISTANT

## 2021-11-23 PROCEDURE — 99211 OFF/OP EST MAY X REQ PHY/QHP: CPT

## 2021-11-23 PROCEDURE — 3017F COLORECTAL CA SCREEN DOC REV: CPT | Performed by: PHYSICIAN ASSISTANT

## 2021-11-23 PROCEDURE — 99213 OFFICE O/P EST LOW 20 MIN: CPT | Performed by: PHYSICIAN ASSISTANT

## 2021-11-23 ASSESSMENT — ENCOUNTER SYMPTOMS
COLOR CHANGE: 0
CONSTIPATION: 0
ABDOMINAL PAIN: 0
EYE REDNESS: 0
NAUSEA: 0
BACK PAIN: 0
SHORTNESS OF BREATH: 0
VOMITING: 0
COUGH: 0
WHEEZING: 0

## 2021-11-23 NOTE — PROGRESS NOTES
HPI:    Patient is a 80 y.o. male in no acute distress. He is alert and oriented to person, place, and time. History  Patient is here today for bilateral flank pain. Patient does have a history of prostatitis. It has been a number of years since he has been treated for prostatitis. He currently does not take any medications to help him void. Patient has no recent gross hematuria. Patient reports no nausea or vomiting. We did receive cardiac clearance for him to participate in sexual intercourse and to start PD5 inhibitor     PSA from PCP  9/2021 - 0.12    Today:  Patient is here for follow up kidney stones, BPH, and erectile dysfunction. Cialis 20 mg at situational dosing. Patient states he is able to achieve and maintain erection and is very happy with the results. He denies any chest pain, shortness of breath, dyspnea on exertion. He denies any erections lasting more than 4 hours. He has not passed any kidney stones or had any gross hematuria since last visit. He continues to take Flomax for BPH. I did a random bladder scan 11 ml. He denies any fever, chills, gross hematuria, flank pain, dysuria. Overall is happy. Past Medical History:   Diagnosis Date    COPD (chronic obstructive pulmonary disease) ([facility] [address]     Hypertension      Past Surgical History:   Procedure Laterality Date    CARDIAC SURGERY  2019    triple bipass    HERNIA REPAIR      NECK SURGERY      VASECTOMY       Outpatient Encounter Medications as of 11/23/2021   Medication Sig Dispense Refill    tadalafil (CIALIS) 20 MG tablet Take 1 tab by mouth daily as needed for ED, take on empty stomach at least 1/2-hour before activity. Do not exceed 1 tab 30 tablet 1    gabapentin (NEURONTIN) 100 MG capsule Take 100 mg by mouth 3 times daily.  Take 2 tablets three times a day      hydroCHLOROthiazide (MICROZIDE) 12.5 MG capsule Take 12.5 mg by mouth every morning       metoprolol succinate (TOPROL XL) 100 MG extended release tablet       pravastatin (PRAVACHOL) 40 MG tablet Take 40 mg by mouth daily       tiZANidine (ZANAFLEX) 2 MG tablet daily       Melatonin 10 MG TABS Take by mouth as needed       diphenhydrAMINE (BENADRYL) 50 MG capsule Take 50 mg by mouth every 6 hours as needed for Itching      vitamin B-12 (CYANOCOBALAMIN) 1000 MCG tablet Take 1,000 mcg by mouth daily      celecoxib (CELEBREX) 200 MG capsule Take 200 mg by mouth daily      finasteride (PROPECIA) 1 MG tablet Take 1 mg by mouth daily      glucosamine-chondroitin 500-400 MG tablet Take 1 tablet by mouth 2 times daily      tamsulosin (FLOMAX) 0.4 MG capsule Take 1 capsule by mouth every evening 90 capsule 3    aspirin 81 MG chewable tablet Take 81 mg by mouth daily      famotidine (PEPCID) 40 MG tablet Take 40 mg by mouth daily      metoprolol (LOPRESSOR) 100 MG tablet Take 100 mg by mouth daily       acetaminophen (TYLENOL) 500 MG tablet Take 500 mg by mouth every 6 hours as needed      Vitamin E 180 MG CAPS Take by mouth daily Take 2 tablets daily      Multiple Vitamins-Minerals (MULTIVITAL PO) Take by mouth daily      Omega-3 Fatty Acids (FISH OIL PO) Take 1,000 mg by mouth daily       No facility-administered encounter medications on file as of 11/23/2021. Current Outpatient Medications on File Prior to Visit   Medication Sig Dispense Refill    tadalafil (CIALIS) 20 MG tablet Take 1 tab by mouth daily as needed for ED, take on empty stomach at least 1/2-hour before activity. Do not exceed 1 tab 30 tablet 1    gabapentin (NEURONTIN) 100 MG capsule Take 100 mg by mouth 3 times daily.  Take 2 tablets three times a day      hydroCHLOROthiazide (MICROZIDE) 12.5 MG capsule Take 12.5 mg by mouth every morning       metoprolol succinate (TOPROL XL) 100 MG extended release tablet       pravastatin (PRAVACHOL) 40 MG tablet Take 40 mg by mouth daily       tiZANidine (ZANAFLEX) 2 MG tablet daily       Melatonin 10 MG TABS Take by mouth as needed  diphenhydrAMINE (BENADRYL) 50 MG capsule Take 50 mg by mouth every 6 hours as needed for Itching      vitamin B-12 (CYANOCOBALAMIN) 1000 MCG tablet Take 1,000 mcg by mouth daily      celecoxib (CELEBREX) 200 MG capsule Take 200 mg by mouth daily      finasteride (PROPECIA) 1 MG tablet Take 1 mg by mouth daily      glucosamine-chondroitin 500-400 MG tablet Take 1 tablet by mouth 2 times daily      tamsulosin (FLOMAX) 0.4 MG capsule Take 1 capsule by mouth every evening 90 capsule 3    aspirin 81 MG chewable tablet Take 81 mg by mouth daily      famotidine (PEPCID) 40 MG tablet Take 40 mg by mouth daily      metoprolol (LOPRESSOR) 100 MG tablet Take 100 mg by mouth daily       acetaminophen (TYLENOL) 500 MG tablet Take 500 mg by mouth every 6 hours as needed      Vitamin E 180 MG CAPS Take by mouth daily Take 2 tablets daily      Multiple Vitamins-Minerals (MULTIVITAL PO) Take by mouth daily      Omega-3 Fatty Acids (FISH OIL PO) Take 1,000 mg by mouth daily       No current facility-administered medications on file prior to visit. Atorvastatin  Family History   Problem Relation Age of Onset    Cancer Mother      Social History     Tobacco Use   Smoking Status Current Every Day Smoker    Packs/day: 1.00    Years: 50.00    Pack years: 50.00    Types: Cigarettes   Smokeless Tobacco Former User    Types: Snuff    Quit date: 2019       Social History     Substance and Sexual Activity   Alcohol Use Yes    Comment: occasional       Review of Systems   Constitutional: Negative for appetite change, chills and fever. Eyes: Negative for redness and visual disturbance. Respiratory: Negative for cough, shortness of breath and wheezing. Cardiovascular: Negative for chest pain and leg swelling. Gastrointestinal: Negative for abdominal pain, constipation, nausea and vomiting.    Genitourinary: Negative for decreased urine volume, difficulty urinating, dysuria, enuresis, flank pain, frequency, hematuria, penile discharge, penile pain, scrotal swelling, testicular pain and urgency. Positive for nocturia x1   Musculoskeletal: Negative for back pain, joint swelling and myalgias. Skin: Negative for color change, rash and wound. Neurological: Negative for dizziness, tremors and numbness. Hematological: Negative for adenopathy. Does not bruise/bleed easily. BP (!) 143/78   Temp 97.9 °F (36.6 °C) (Infrared)   Ht 5' 9\" (1.753 m)   Wt 231 lb (104.8 kg)   BMI 34.11 kg/m²       PHYSICAL EXAM:  Constitutional: Patient resting comfortably, in no acute distress. Neuro: Alert and oriented to person place and time. Psych: Mood and affect normal.  HEENT: normocephalic, atraumatic  Lungs: Respiratory effort normal, unlabored  Abdomen: Soft, non-tender, non-distended  : No CVA tenderness. Rectal: deferred     No results found for: BUN  No results found for: CREATININE    ASSESSMENT:   Diagnosis Orders   1. Kidney stone  XR ABDOMEN (KUB) (SINGLE AP VIEW)   2. Erectile dysfunction, unspecified erectile dysfunction type     3. BPH with obstruction/lower urinary tract symptoms             PLAN:  Continue Cialis 20 mg situational dosing, he is aware that if he develops an erection lasting more than 4 hours he is to go to the emergency room. Continue Flomax    Patient instructed to drink at least 80 ounces of \"good fluids\" daily (water, juice, Gatoraide, milk) and to avoid/minimize intake of \"bad fluids\" (soda pop, coffee, tea, alcohol, energy drinks). Also advised to avoid excessive consumption of sodium and animal protein to decrease risk of recurrent kidney stones. Follow-up in 1 year with KUB and PVR.

## 2021-11-23 NOTE — PATIENT INSTRUCTIONS
STONE PREVENTION    To prevent future stone formation:    1) DO drink ~65-80 oz (2-2.5L) of water per day to stay adequately hydrated     2) AVOID/LIMIT intake of \"bad fluids\": soda/pop, coffee, tea, alcohol, energy drinks, any beverage with caffeine can act to dehydrate you       \"BAD FLUIDS\" DO NOT COUNT TOWARD THE 65-80oz of water    3) REDUCE consumption of sodium/salt - DO NOT salt your food, read labels (lunch meats, canned soups, prepared meals are high in salt), choose low salt options    4) DO NOT EAT animal protein/meat more than 2 meals a day - this includes red meat, pork, poultry and fish      SURVEY:    You may be receiving a survey from [employer] regarding your visit today. Please complete the survey to enable us to provide the highest quality of care to you and your family. If you cannot score us a very good on any question, please call the office to discuss how we could have made your experience a very good one. Thank you.

## 2021-12-07 DIAGNOSIS — N13.8 BPH WITH OBSTRUCTION/LOWER URINARY TRACT SYMPTOMS: ICD-10-CM

## 2021-12-07 DIAGNOSIS — N40.1 BPH WITH OBSTRUCTION/LOWER URINARY TRACT SYMPTOMS: ICD-10-CM

## 2021-12-07 RX ORDER — TAMSULOSIN HYDROCHLORIDE 0.4 MG/1
CAPSULE ORAL
Qty: 90 CAPSULE | Refills: 3 | Status: SHIPPED | OUTPATIENT
Start: 2021-12-07

## 2022-02-10 RX ORDER — TADALAFIL 20 MG/1
TABLET ORAL
Qty: 30 TABLET | Refills: 1 | Status: SHIPPED | OUTPATIENT
Start: 2022-02-10

## 2022-02-10 NOTE — TELEPHONE ENCOUNTER
Patient was seen within the past year. Patient tolerates situational Cialis well. We will send refills.

## 2022-02-10 NOTE — TELEPHONE ENCOUNTER
Jin Salter called requesting a refill of tadalafil 20mg. He states the medication works. Last seen 11/23/2021, next follow up 11/23/2022        Health Maintenance   Topic Date Due    Potassium monitoring  Never done    Creatinine monitoring  Never done    Hepatitis C screen  Never done    Lipid screen  Never done    Depression Screen  Never done    DTaP/Tdap/Td vaccine (1 - Tdap) Never done    Diabetes screen  Never done    Colon cancer screen colonoscopy  Never done    Shingles Vaccine (1 of 2) Never done    Low dose CT lung screening  Never done   [employer] Visit (AWV)  Never done    COVID-19 Vaccine (2 - Booster for CustomMade series) 06/02/2021    Flu vaccine  Completed    Pneumococcal 65+ years Vaccine  Completed    AAA screen  Completed    Hepatitis A vaccine  Aged Out    Hepatitis B vaccine  Aged Out    Hib vaccine  Aged Out    Meningococcal (ACWY) vaccine  Aged Out             (applicable per patient's age: Cancer Screenings, Depression Screening, Fall Risk Screening, Immunizations)    No results found for: LABA1C, LABMICR, LDLCHOLESTEROL, LDLCALC, AST, ALT, BUN   (goal A1C is < 7)   (goal LDL is <100) need 30-50% reduction from baseline     BP Readings from Last 3 Encounters:   11/23/21 (!) 143/78   07/08/21 (!) 158/88   05/18/21 133/79    (goal /80)      All Future Testing planned in [facility]:  Lab Frequency Next Occurrence   XR ABDOMEN (KUB) (SINGLE AP VIEW) Once 11/23/2022       Next Visit Date:  Future Appointments   Date Time Provider Meka Merchant   11/22/2022 11:00 AM CORAZON Lewis - CNP [facility]            There is no problem list on file for this patient.

## 2022-11-21 ENCOUNTER — HOSPITAL ENCOUNTER (OUTPATIENT)
Age: 72
Discharge: HOME | End: 2022-11-23
Payer: MEDICARE

## 2022-11-21 ENCOUNTER — HOSPITAL ENCOUNTER (OUTPATIENT)
Dept: GENERAL RADIOLOGY | Age: 72
Discharge: HOME | End: 2022-11-23
Payer: MEDICARE

## 2022-11-21 DIAGNOSIS — N20.0 KIDNEY STONE: ICD-10-CM

## 2022-11-21 PROCEDURE — 74018 RADEX ABDOMEN 1 VIEW: CPT

## 2022-11-22 ENCOUNTER — TELEPHONE (OUTPATIENT)
Dept: UROLOGY | Age: 72
End: 2022-11-22

## 2022-11-22 ENCOUNTER — OFFICE VISIT (OUTPATIENT)
Dept: UROLOGY | Age: 72
End: 2022-11-22
Payer: MEDICARE

## 2022-11-22 VITALS
DIASTOLIC BLOOD PRESSURE: 79 MMHG | BODY MASS INDEX: 32.05 KG/M2 | HEART RATE: 72 BPM | WEIGHT: 217 LBS | SYSTOLIC BLOOD PRESSURE: 139 MMHG

## 2022-11-22 DIAGNOSIS — N13.8 BPH WITH OBSTRUCTION/LOWER URINARY TRACT SYMPTOMS: ICD-10-CM

## 2022-11-22 DIAGNOSIS — N52.9 ERECTILE DYSFUNCTION, UNSPECIFIED ERECTILE DYSFUNCTION TYPE: ICD-10-CM

## 2022-11-22 DIAGNOSIS — N40.1 BPH WITH OBSTRUCTION/LOWER URINARY TRACT SYMPTOMS: ICD-10-CM

## 2022-11-22 DIAGNOSIS — N20.0 KIDNEY STONE: Primary | ICD-10-CM

## 2022-11-22 DIAGNOSIS — Z12.5 SCREENING PSA (PROSTATE SPECIFIC ANTIGEN): Primary | ICD-10-CM

## 2022-11-22 PROCEDURE — G8484 FLU IMMUNIZE NO ADMIN: HCPCS | Performed by: NURSE PRACTITIONER

## 2022-11-22 PROCEDURE — G8427 DOCREV CUR MEDS BY ELIG CLIN: HCPCS | Performed by: NURSE PRACTITIONER

## 2022-11-22 PROCEDURE — PBSHW PR MEASUREMENT,POST-VOID RESIDUAL VOLUME BY US,NON-IMAGING: Performed by: NURSE PRACTITIONER

## 2022-11-22 PROCEDURE — 3017F COLORECTAL CA SCREEN DOC REV: CPT | Performed by: NURSE PRACTITIONER

## 2022-11-22 PROCEDURE — 1123F ACP DISCUSS/DSCN MKR DOCD: CPT | Performed by: NURSE PRACTITIONER

## 2022-11-22 PROCEDURE — 4004F PT TOBACCO SCREEN RCVD TLK: CPT | Performed by: NURSE PRACTITIONER

## 2022-11-22 PROCEDURE — G8417 CALC BMI ABV UP PARAM F/U: HCPCS | Performed by: NURSE PRACTITIONER

## 2022-11-22 PROCEDURE — 51798 US URINE CAPACITY MEASURE: CPT | Performed by: NURSE PRACTITIONER

## 2022-11-22 PROCEDURE — 99213 OFFICE O/P EST LOW 20 MIN: CPT | Performed by: NURSE PRACTITIONER

## 2022-11-22 RX ORDER — TADALAFIL 20 MG/1
TABLET ORAL
Qty: 30 TABLET | Refills: 1 | Status: SHIPPED | OUTPATIENT
Start: 2022-11-22

## 2022-11-22 RX ORDER — TAMSULOSIN HYDROCHLORIDE 0.4 MG/1
CAPSULE ORAL
Qty: 90 CAPSULE | Refills: 3 | Status: SHIPPED | OUTPATIENT
Start: 2022-11-22

## 2022-11-22 ASSESSMENT — ENCOUNTER SYMPTOMS
VOMITING: 0
EYE REDNESS: 0
SHORTNESS OF BREATH: 0
COUGH: 0
COLOR CHANGE: 0
CONSTIPATION: 0
BACK PAIN: 0
WHEEZING: 0
NAUSEA: 0
ABDOMINAL PAIN: 0

## 2022-11-22 NOTE — PROGRESS NOTES
HPI:          Patient is a 71 y.o. male in no acute distress. He is alert and oriented to person, place, and time. History  2020 Referral for bilateral flank pain and chronic prostatitis   Started flomax    3/2020 CT showed punctate right renal stones    3/2021 cardiac clearance for sexual activity   Started cialis 20mg PRN    Today  Here today to follow-up for BPH, ED, and renal stones. He denies any dysuria or gross hematuria. He denies flank or abdominal pain. He denies frequency, urgency, nocturia or incontinence. PVR is low. He did have a KUB completed prior to today's visit. This film was independently reviewed and shows no evidence of  calcifications. He is taking Cialis 20 mg as needed for erectile dysfunction. He is able to achieve and maintain an erection with this dose. He denies any erections lasting more than 4 hours. He denies any pain with erections or curvature. Past Medical History:   Diagnosis Date    COPD (chronic obstructive pulmonary disease) ([facility] [address]     Hypertension      Past Surgical History:   Procedure Laterality Date    CARDIAC SURGERY  2019    triple bipass    HERNIA REPAIR      NECK SURGERY      VASECTOMY       Outpatient Encounter Medications as of 11/22/2022   Medication Sig Dispense Refill    tamsulosin (FLOMAX) 0.4 MG capsule TAKE ONE CAPSULE BY MOUTH EVERY EVENING 90 capsule 3    tadalafil (CIALIS) 20 MG tablet Take 1 tab by mouth daily as needed for ED, take on empty stomach at least 1/2-hour before activity. Do not exceed 1 tab 30 tablet 1    gabapentin (NEURONTIN) 100 MG capsule Take 100 mg by mouth 3 times daily.  Take 2 tablets three times a day      hydroCHLOROthiazide (MICROZIDE) 12.5 MG capsule Take 12.5 mg by mouth every morning       metoprolol succinate (TOPROL XL) 100 MG extended release tablet       pravastatin (PRAVACHOL) 40 MG tablet Take 40 mg by mouth daily       tiZANidine (ZANAFLEX) 2 MG tablet daily       Melatonin 10 MG TABS Take by mouth as needed       diphenhydrAMINE (BENADRYL) 50 MG capsule Take 50 mg by mouth every 6 hours as needed for Itching      vitamin B-12 (CYANOCOBALAMIN) 1000 MCG tablet Take 1,000 mcg by mouth daily      celecoxib (CELEBREX) 200 MG capsule Take 200 mg by mouth daily      finasteride (PROPECIA) 1 MG tablet Take 1 mg by mouth daily      glucosamine-chondroitin 500-400 MG tablet Take 1 tablet by mouth 2 times daily      aspirin 81 MG chewable tablet Take 81 mg by mouth daily      famotidine (PEPCID) 40 MG tablet Take 40 mg by mouth daily      metoprolol (LOPRESSOR) 100 MG tablet Take 100 mg by mouth daily       acetaminophen (TYLENOL) 500 MG tablet Take 500 mg by mouth every 6 hours as needed      Vitamin E 180 MG CAPS Take by mouth daily Take 2 tablets daily      Multiple Vitamins-Minerals (MULTIVITAL PO) Take by mouth daily      Omega-3 Fatty Acids (FISH OIL PO) Take 1,000 mg by mouth daily      [DISCONTINUED] tadalafil (CIALIS) 20 MG tablet Take 1 tab by mouth daily as needed for ED, take on empty stomach at least 1/2-hour before activity. Do not exceed 1 tab 30 tablet 1    [DISCONTINUED] tamsulosin (FLOMAX) 0.4 MG capsule TAKE ONE CAPSULE BY MOUTH EVERY EVENING 90 capsule 3     No facility-administered encounter medications on file as of 11/22/2022. Current Outpatient Medications on File Prior to Visit   Medication Sig Dispense Refill    gabapentin (NEURONTIN) 100 MG capsule Take 100 mg by mouth 3 times daily.  Take 2 tablets three times a day      hydroCHLOROthiazide (MICROZIDE) 12.5 MG capsule Take 12.5 mg by mouth every morning       metoprolol succinate (TOPROL XL) 100 MG extended release tablet       pravastatin (PRAVACHOL) 40 MG tablet Take 40 mg by mouth daily       tiZANidine (ZANAFLEX) 2 MG tablet daily       Melatonin 10 MG TABS Take by mouth as needed       diphenhydrAMINE (BENADRYL) 50 MG capsule Take 50 mg by mouth every 6 hours as needed for Itching      vitamin B-12 (CYANOCOBALAMIN) 1000 MCG tablet Take 1,000 mcg by mouth daily      celecoxib (CELEBREX) 200 MG capsule Take 200 mg by mouth daily      finasteride (PROPECIA) 1 MG tablet Take 1 mg by mouth daily      glucosamine-chondroitin 500-400 MG tablet Take 1 tablet by mouth 2 times daily      aspirin 81 MG chewable tablet Take 81 mg by mouth daily      famotidine (PEPCID) 40 MG tablet Take 40 mg by mouth daily      metoprolol (LOPRESSOR) 100 MG tablet Take 100 mg by mouth daily       acetaminophen (TYLENOL) 500 MG tablet Take 500 mg by mouth every 6 hours as needed      Vitamin E 180 MG CAPS Take by mouth daily Take 2 tablets daily      Multiple Vitamins-Minerals (MULTIVITAL PO) Take by mouth daily      Omega-3 Fatty Acids (FISH OIL PO) Take 1,000 mg by mouth daily       No current facility-administered medications on file prior to visit. Atorvastatin  Family History   Problem Relation Age of Onset    Cancer Mother      Social History     Tobacco Use   Smoking Status Every Day    Packs/day: 1.00    Years: 50.00    Pack years: 50.00    Types: Cigarettes   Smokeless Tobacco Former    Types: Snuff    Quit date: 2019       Social History     Substance and Sexual Activity   Alcohol Use Yes    Comment: occasional       Review of Systems   Constitutional:  Negative for appetite change, chills and fever. Eyes:  Negative for redness and visual disturbance. Respiratory:  Negative for cough, shortness of breath and wheezing. Cardiovascular:  Negative for chest pain and leg swelling. Gastrointestinal:  Negative for abdominal pain, constipation, nausea and vomiting. Genitourinary:  Negative for decreased urine volume, difficulty urinating, dysuria, enuresis, flank pain, frequency, hematuria, penile discharge, penile pain, scrotal swelling, testicular pain and urgency. Musculoskeletal:  Negative for back pain, joint swelling and myalgias. Skin:  Negative for color change, rash and wound.    Neurological:  Negative for dizziness, tremors and numbness. Hematological:  Negative for adenopathy. Does not bruise/bleed easily. /79 (Site: Left Upper Arm, Position: Sitting, Cuff Size: Large Adult)   Pulse 72   Wt 217 lb (98.4 kg)   BMI 32.05 kg/m²       PHYSICAL EXAM:  Constitutional: Patient in no acute distress; Neuro: alert and oriented to person place and time. Psych: Mood and affect normal.  Skin: Normal  Lungs: Respiratory effort normal  Cardiovascular:  Normal peripheral pulses  Abdomen: Soft, non-tender, non-distended with no CVA, flank pain  Bladder non-tender and not distended. No results found for: BUN  No results found for: CREATININE  No results found for: PSA    ASSESSMENT:   Diagnosis Orders   1. Kidney stone  XR ABDOMEN (KUB) (SINGLE AP VIEW)      2. BPH with obstruction/lower urinary tract symptoms  LA MEASUREMENT,POST-VOID RESIDUAL VOLUME BY US,NON-IMAGING      3. Erectile dysfunction, unspecified erectile dysfunction type              PLAN:  To prevent future stone formation:  1) drink around 80 ounces of water per day  2) Avoid/minimize intake of \"bad fluids\" (soda pop, coffee, tea, alcohol, energy drinks)  3) reduce consumption of sodium/salt  4) reduce consumption of fatty animal protein (full-fat cheese/milk/dairy, red meats, pork).  Limit protein intake to low-fat/lean options (low-fat dairy, fish, chicken, turkey)    Continue flomax daily    Continue cialis if needed    We will obtain PSA from PCPs office    F/U in one year with a KUB prior or sooner if needed

## 2022-11-22 NOTE — TELEPHONE ENCOUNTER
Tried contacting the PCP office and received recording. Will have to try back at a later time today.

## 2022-11-29 NOTE — TELEPHONE ENCOUNTER
Please let him know that his PCP does not have any more recent prostate cancer screening numbers since 2020. We will place an order for this and call him with results.

## 2022-11-29 NOTE — TELEPHONE ENCOUNTER
Dr. Sera Loredo office called and says that patient has not completed a recent PSA. Last one on file is the same one we have in chart (9/2020).

## 2022-12-13 ENCOUNTER — HOSPITAL ENCOUNTER (OUTPATIENT)
Age: 72
Discharge: HOME | End: 2022-12-13
Payer: MEDICARE

## 2022-12-13 DIAGNOSIS — Z12.5 SCREENING PSA (PROSTATE SPECIFIC ANTIGEN): ICD-10-CM

## 2022-12-13 PROCEDURE — 36415 COLL VENOUS BLD VENIPUNCTURE: CPT

## 2022-12-13 PROCEDURE — G0103 PSA SCREENING: HCPCS

## 2022-12-14 ENCOUNTER — TELEPHONE (OUTPATIENT)
Dept: UROLOGY | Age: 72
End: 2022-12-14

## 2022-12-14 DIAGNOSIS — Z12.5 SCREENING PSA (PROSTATE SPECIFIC ANTIGEN): Primary | ICD-10-CM

## 2022-12-14 LAB — PROSTATE SPECIFIC ANTIGEN: 0.42 NG/ML

## 2022-12-14 NOTE — TELEPHONE ENCOUNTER
Please let him know that his PSA was 0.41. 2 years ago was 0.12. This is low and stable for someone who is his age.     (Discussed with Dr. Miah Fishre)

## 2024-04-03 ENCOUNTER — OFFICE VISIT (OUTPATIENT)
Dept: GASTROENTEROLOGY | Age: 74
End: 2024-04-03
Payer: MEDICARE

## 2024-04-03 ENCOUNTER — HOSPITAL ENCOUNTER (OUTPATIENT)
Age: 74
Discharge: HOME | End: 2024-04-03
Payer: MEDICARE

## 2024-04-03 VITALS
BODY MASS INDEX: 35.34 KG/M2 | SYSTOLIC BLOOD PRESSURE: 148 MMHG | HEART RATE: 72 BPM | HEIGHT: 69 IN | RESPIRATION RATE: 18 BRPM | DIASTOLIC BLOOD PRESSURE: 90 MMHG | OXYGEN SATURATION: 98 % | WEIGHT: 238.6 LBS

## 2024-04-03 DIAGNOSIS — R14.3 FLATULENCE, ERUCTATION AND GAS PAIN: ICD-10-CM

## 2024-04-03 DIAGNOSIS — G89.29 CHRONIC ABDOMINAL PAIN: ICD-10-CM

## 2024-04-03 DIAGNOSIS — R14.1 FLATULENCE, ERUCTATION AND GAS PAIN: Primary | ICD-10-CM

## 2024-04-03 DIAGNOSIS — R14.1 FLATULENCE, ERUCTATION AND GAS PAIN: ICD-10-CM

## 2024-04-03 DIAGNOSIS — R14.2 FLATULENCE, ERUCTATION AND GAS PAIN: Primary | ICD-10-CM

## 2024-04-03 DIAGNOSIS — R14.2 FLATULENCE, ERUCTATION AND GAS PAIN: ICD-10-CM

## 2024-04-03 DIAGNOSIS — R10.9 CHRONIC ABDOMINAL PAIN: ICD-10-CM

## 2024-04-03 DIAGNOSIS — R14.3 FLATULENCE, ERUCTATION AND GAS PAIN: Primary | ICD-10-CM

## 2024-04-03 LAB
ALBUMIN SERPL-MCNC: 4.4 G/DL (ref 3.5–5.2)
ALBUMIN/GLOB SERPL: 1.5 {RATIO} (ref 1–2.5)
ALP SERPL-CCNC: 61 U/L (ref 40–129)
ALT SERPL-CCNC: 23 U/L (ref 5–41)
ANION GAP SERPL CALCULATED.3IONS-SCNC: 10 MMOL/L (ref 9–17)
AST SERPL-CCNC: 19 U/L
BASOPHILS # BLD: 0.05 K/UL (ref 0–0.2)
BASOPHILS NFR BLD: 1 % (ref 0–2)
BILIRUB DIRECT SERPL-MCNC: <0.1 MG/DL
BILIRUB INDIRECT SERPL-MCNC: NORMAL MG/DL (ref 0–1)
BILIRUB SERPL-MCNC: 0.3 MG/DL (ref 0.3–1.2)
BUN SERPL-MCNC: 13 MG/DL (ref 8–23)
CALCIUM SERPL-MCNC: 9.3 MG/DL (ref 8.6–10.4)
CHLORIDE SERPL-SCNC: 102 MMOL/L (ref 98–107)
CO2 SERPL-SCNC: 29 MMOL/L (ref 20–31)
CREAT SERPL-MCNC: 0.8 MG/DL (ref 0.7–1.2)
EOSINOPHIL # BLD: 0.23 K/UL (ref 0–0.44)
EOSINOPHILS RELATIVE PERCENT: 3 % (ref 1–4)
ERYTHROCYTE [DISTWIDTH] IN BLOOD BY AUTOMATED COUNT: 12.8 % (ref 11.8–14.4)
GFR SERPL CREATININE-BSD FRML MDRD: >90 ML/MIN/1.73M2
GLUCOSE SERPL-MCNC: 99 MG/DL (ref 70–99)
HCT VFR BLD AUTO: 42.4 % (ref 40.7–50.3)
HGB BLD-MCNC: 14.5 G/DL (ref 13–17)
IMM GRANULOCYTES # BLD AUTO: 0.06 K/UL (ref 0–0.3)
IMM GRANULOCYTES NFR BLD: 1 %
LIPASE SERPL-CCNC: 31 U/L (ref 13–60)
LYMPHOCYTES NFR BLD: 1.83 K/UL (ref 1.1–3.7)
LYMPHOCYTES RELATIVE PERCENT: 25 % (ref 24–43)
MCH RBC QN AUTO: 32.4 PG (ref 25.2–33.5)
MCHC RBC AUTO-ENTMCNC: 34.2 G/DL (ref 28.4–34.8)
MCV RBC AUTO: 94.9 FL (ref 82.6–102.9)
MONOCYTES NFR BLD: 0.75 K/UL (ref 0.1–1.2)
MONOCYTES NFR BLD: 10 % (ref 3–12)
NEUTROPHILS NFR BLD: 60 % (ref 36–65)
NEUTS SEG NFR BLD: 4.55 K/UL (ref 1.5–8.1)
NRBC BLD-RTO: 0 PER 100 WBC
PLATELET # BLD AUTO: 187 K/UL (ref 138–453)
PMV BLD AUTO: 11.5 FL (ref 8.1–13.5)
POTASSIUM SERPL-SCNC: 4.6 MMOL/L (ref 3.7–5.3)
PROT SERPL-MCNC: 7.4 G/DL (ref 6.4–8.3)
RBC # BLD AUTO: 4.47 M/UL (ref 4.21–5.77)
SODIUM SERPL-SCNC: 141 MMOL/L (ref 135–144)
WBC OTHER # BLD: 7.5 K/UL (ref 3.5–11.3)

## 2024-04-03 PROCEDURE — 85025 COMPLETE CBC W/AUTO DIFF WBC: CPT

## 2024-04-03 PROCEDURE — 36415 COLL VENOUS BLD VENIPUNCTURE: CPT

## 2024-04-03 PROCEDURE — 4004F PT TOBACCO SCREEN RCVD TLK: CPT | Performed by: NURSE PRACTITIONER

## 2024-04-03 PROCEDURE — G8419 CALC BMI OUT NRM PARAM NOF/U: HCPCS | Performed by: NURSE PRACTITIONER

## 2024-04-03 PROCEDURE — 1123F ACP DISCUSS/DSCN MKR DOCD: CPT | Performed by: NURSE PRACTITIONER

## 2024-04-03 PROCEDURE — 99203 OFFICE O/P NEW LOW 30 MIN: CPT | Performed by: NURSE PRACTITIONER

## 2024-04-03 PROCEDURE — 83690 ASSAY OF LIPASE: CPT

## 2024-04-03 PROCEDURE — 3017F COLORECTAL CA SCREEN DOC REV: CPT | Performed by: NURSE PRACTITIONER

## 2024-04-03 PROCEDURE — 82248 BILIRUBIN DIRECT: CPT

## 2024-04-03 PROCEDURE — 80053 COMPREHEN METABOLIC PANEL: CPT

## 2024-04-03 PROCEDURE — G8427 DOCREV CUR MEDS BY ELIG CLIN: HCPCS | Performed by: NURSE PRACTITIONER

## 2024-04-03 PROCEDURE — 82784 ASSAY IGA/IGD/IGG/IGM EACH: CPT

## 2024-04-03 PROCEDURE — 83516 IMMUNOASSAY NONANTIBODY: CPT

## 2024-04-03 RX ORDER — CLOPIDOGREL BISULFATE 75 MG/1
TABLET ORAL
COMMUNITY
Start: 2024-03-22

## 2024-04-03 RX ORDER — PANTOPRAZOLE SODIUM 40 MG/1
40 TABLET, DELAYED RELEASE ORAL DAILY
COMMUNITY
Start: 2024-02-20

## 2024-04-03 RX ORDER — TRAZODONE HYDROCHLORIDE 100 MG/1
TABLET ORAL
COMMUNITY
Start: 2024-03-05

## 2024-04-03 NOTE — PROGRESS NOTES
and hypertension, CAD (s/p triple bypass 2019) presents as a new GI referral with concerns for persistent upset stomach, gassiness and bloating.  Discussed concern for IBS, IBD, Celiac; will pursue with further etiology work up to assess for any associated malabsorptive/maldigestive disorders and or infections.  Recent EGD and colonoscopy reports provided by patient, scanned in media will request pathologies.  CT abdomen due to amount of reported abdominal pain.  ER for any worsening or concerns.      Plan    1. Flatulence, eructation and gas pain  - H. Pylori Antigen, Stool; Future  - Celiac Disease Panel; Future  - Clostridium Difficile Toxin/Antigen; Future  - Lipase; Future  - Comprehensive Metabolic Panel with Bilirubin; Future  - CBC with Auto Differential; Future  - CT ABDOMEN PELVIS W IV CONTRAST Additional Contrast? Radiologist Recommendation; Future    2. Chronic abdominal pain  - CT ABDOMEN PELVIS W IV CONTRAST Additional Contrast? Radiologist Recommendation; Future    3. Additional recommendations based on findings.      Spent 20 minutes with the patient with greater than 50 percent of the time was spent on face-to-face time in discussion with the patient regarding diagnostic options/results, treatment options, counseling, and follow-up plan.      Marcia Foley, APRN - CNP    *This report was transcribed using voice recognition software. Every effort was made to ensure accuracy; however, inadvertent computerized transcription errors may be present.

## 2024-04-04 ENCOUNTER — HOSPITAL ENCOUNTER (OUTPATIENT)
Age: 74
Setting detail: SPECIMEN
Discharge: HOME | End: 2024-04-04
Payer: MEDICARE

## 2024-04-04 ENCOUNTER — TELEPHONE (OUTPATIENT)
Dept: GASTROENTEROLOGY | Age: 74
End: 2024-04-04

## 2024-04-04 ENCOUNTER — HOSPITAL ENCOUNTER (OUTPATIENT)
Dept: CT IMAGING | Age: 74
Discharge: HOME | End: 2024-04-06
Payer: MEDICARE

## 2024-04-04 DIAGNOSIS — R14.1 FLATULENCE, ERUCTATION AND GAS PAIN: ICD-10-CM

## 2024-04-04 DIAGNOSIS — R14.3 FLATULENCE, ERUCTATION AND GAS PAIN: ICD-10-CM

## 2024-04-04 DIAGNOSIS — R10.9 CHRONIC ABDOMINAL PAIN: ICD-10-CM

## 2024-04-04 DIAGNOSIS — R14.2 FLATULENCE, ERUCTATION AND GAS PAIN: ICD-10-CM

## 2024-04-04 DIAGNOSIS — G89.29 CHRONIC ABDOMINAL PAIN: ICD-10-CM

## 2024-04-04 LAB
C DIFF GDH + TOXINS A+B STL QL IA.RAPID: NEGATIVE
SPECIMEN DESCRIPTION: NORMAL

## 2024-04-04 PROCEDURE — 6360000004 HC RX CONTRAST MEDICATION: Performed by: NURSE PRACTITIONER

## 2024-04-04 PROCEDURE — 87324 CLOSTRIDIUM AG IA: CPT

## 2024-04-04 PROCEDURE — 74177 CT ABD & PELVIS W/CONTRAST: CPT

## 2024-04-04 PROCEDURE — 87449 NOS EACH ORGANISM AG IA: CPT

## 2024-04-04 PROCEDURE — 87338 HPYLORI STOOL AG IA: CPT

## 2024-04-04 RX ADMIN — IOPAMIDOL 75 ML: 755 INJECTION, SOLUTION INTRAVENOUS at 09:59

## 2024-04-04 RX ADMIN — IOHEXOL 20 ML: 240 INJECTION, SOLUTION INTRATHECAL; INTRAVASCULAR; INTRAVENOUS; ORAL at 09:59

## 2024-04-04 NOTE — TELEPHONE ENCOUNTER
----- Message from CORAZON Houston - CNP sent at 4/4/2024  1:10 PM EDT -----  Please let Reg know that his CT abdomen returned showing diverticulosis that are not inflamed.  He also has a small umbilical hernia and hepatic steatosis (fatty liver).  I would recommend he submit the stool testing sample.    Thanks, Marcia

## 2024-04-04 NOTE — TELEPHONE ENCOUNTER
----- Message from CORAZON [place] - CNP sent at 4/4/2024 10:19 AM EDT -----  Please notify Reg that his CBC returned showing no infection with a normal white count and no anemia.  Thanks, Marcia.

## 2024-04-05 ENCOUNTER — TELEPHONE (OUTPATIENT)
Dept: GASTROENTEROLOGY | Age: 74
End: 2024-04-05

## 2024-04-05 NOTE — TELEPHONE ENCOUNTER
----- Message from CORAZON Houston - CNP sent at 4/5/2024  8:31 AM EDT -----  Please notify Reg that his C. difficile stool specimen returned negative.  Thanks, Marcia.

## 2024-04-06 LAB
GLIADIN IGA SER IA-ACNC: 0.9 U/ML
GLIADIN IGG SER IA-ACNC: <0.4 U/ML
IGA SERPL-MCNC: 247 MG/DL (ref 70–400)
MICROORGANISM/AGENT SPEC: NEGATIVE
SPECIMEN DESCRIPTION: NORMAL
TTG IGA SER IA-ACNC: 0.6 U/ML

## 2024-04-08 ASSESSMENT — ENCOUNTER SYMPTOMS
SHORTNESS OF BREATH: 0
ABDOMINAL PAIN: 1
COUGH: 0
WHEEZING: 0

## 2024-04-09 ENCOUNTER — TELEPHONE (OUTPATIENT)
Dept: GASTROENTEROLOGY | Age: 74
End: 2024-04-09

## 2024-04-09 NOTE — TELEPHONE ENCOUNTER
----- Message from CORAZON [place] - CNP sent at 4/9/2024  2:40 PM EDT -----  Celiac panel is negative.  Thanks, Marcia.

## 2024-04-09 NOTE — TELEPHONE ENCOUNTER
----- Message from CORAZON [place] - CNP sent at 4/9/2024  2:39 PM EDT -----  Please notify my call that his H. pylori testing is negative.  Thanks, Marcia.

## 2024-04-15 ENCOUNTER — TELEPHONE (OUTPATIENT)
Dept: SURGERY | Age: 74
End: 2024-04-15

## 2024-04-15 NOTE — TELEPHONE ENCOUNTER
Patient called asking for test and lab results. Patient advised of negative testing and labs WNL. He is asking what the next steps of care are since he still having a lot of stomach pain?

## 2024-04-17 ENCOUNTER — TELEPHONE (OUTPATIENT)
Dept: GASTROENTEROLOGY | Age: 74
End: 2024-04-17

## 2024-04-17 DIAGNOSIS — Z87.19 HISTORY OF ABDOMINAL HERNIA: ICD-10-CM

## 2024-04-17 DIAGNOSIS — G89.29 CHRONIC ABDOMINAL PAIN: Primary | ICD-10-CM

## 2024-04-17 DIAGNOSIS — R10.9 CHRONIC ABDOMINAL PAIN: Primary | ICD-10-CM

## 2024-04-17 NOTE — TELEPHONE ENCOUNTER
Patient contacted the office this afternoon upset regarding no contact from office regarding recent result/update. Advised patient providers would be informed of his concerns. Explained writer was able to verify he was previously advised of results by staff. Patient voiced he is still having ongoing abdominal pain and would like to discuss next steps with plan of care with NP. Informed the patient the providers would be advised, and the office would get back with him this afternoon. He voiced understanding.  Please advise. Thank you.

## 2024-05-09 ENCOUNTER — TELEPHONE (OUTPATIENT)
Dept: GASTROENTEROLOGY | Age: 74
End: 2024-05-09

## 2024-05-09 ENCOUNTER — OFFICE VISIT (OUTPATIENT)
Dept: SURGERY | Age: 74
End: 2024-05-09
Payer: MEDICARE

## 2024-05-09 VITALS
HEART RATE: 72 BPM | SYSTOLIC BLOOD PRESSURE: 155 MMHG | WEIGHT: 240 LBS | BODY MASS INDEX: 35.44 KG/M2 | OXYGEN SATURATION: 96 % | RESPIRATION RATE: 18 BRPM | DIASTOLIC BLOOD PRESSURE: 72 MMHG

## 2024-05-09 DIAGNOSIS — R10.13 DYSPEPSIA: ICD-10-CM

## 2024-05-09 DIAGNOSIS — Z72.0 TOBACCO ABUSE: ICD-10-CM

## 2024-05-09 DIAGNOSIS — K42.9 RECURRENT UMBILICAL HERNIA: Primary | ICD-10-CM

## 2024-05-09 DIAGNOSIS — R10.33 PERIUMBILICAL ABDOMINAL PAIN: ICD-10-CM

## 2024-05-09 PROCEDURE — G8427 DOCREV CUR MEDS BY ELIG CLIN: HCPCS | Performed by: SURGERY

## 2024-05-09 PROCEDURE — 1123F ACP DISCUSS/DSCN MKR DOCD: CPT | Performed by: SURGERY

## 2024-05-09 PROCEDURE — 3017F COLORECTAL CA SCREEN DOC REV: CPT | Performed by: SURGERY

## 2024-05-09 PROCEDURE — G8417 CALC BMI ABV UP PARAM F/U: HCPCS | Performed by: SURGERY

## 2024-05-09 PROCEDURE — 99203 OFFICE O/P NEW LOW 30 MIN: CPT | Performed by: SURGERY

## 2024-05-09 PROCEDURE — 1036F TOBACCO NON-USER: CPT | Performed by: SURGERY

## 2024-05-09 ASSESSMENT — ENCOUNTER SYMPTOMS
COUGH: 0
CHOKING: 0
ABDOMINAL PAIN: 1
SHORTNESS OF BREATH: 0
CHEST TIGHTNESS: 0
WHEEZING: 0

## 2024-05-09 NOTE — TELEPHONE ENCOUNTER
Dr. Lee would like records from    [facility] - pathology for endoscopy, GI consult, and cardiology notes (Olivia Tobias)  [facility] - OR report Umbilical Hernia repair  Complete medical h/x from PCP

## 2024-05-09 NOTE — PROGRESS NOTES
Patient has been experiencing periumbilical pain for several months and is concerned that his mesh is loose.  His hernia was repaired in 2014.  He notices a bulge when he bends over.    Feels muscle soreness.    Also has a sour taste constantly, and frequently burps.  Had EGD and colonoscopy in March.  I have his operative reports, but I don't see pathology reports from those procedures.    He is a smoker.  Currently smoking cigars.    Previous umbilical hernia repair in [place].    Past Medical History:   Diagnosis Date    COPD (chronic obstructive pulmonary disease) (HCC)     Hypertension      Past Surgical History:   Procedure Laterality Date    CARDIAC SURGERY  2019    triple bipass    HERNIA REPAIR      NECK SURGERY      VASECTOMY       Current Outpatient Medications   Medication Sig Dispense Refill    traZODone (DESYREL) 100 MG tablet       pantoprazole (PROTONIX) 40 MG tablet Take 1 tablet by mouth daily      tamsulosin (FLOMAX) 0.4 MG capsule TAKE ONE CAPSULE BY MOUTH EVERY EVENING 90 capsule 3    tadalafil (CIALIS) 20 MG tablet Take 1 tab by mouth daily as needed for ED, take on empty stomach at least 1/2-hour before activity.  Do not exceed 1 tab 30 tablet 1    hydroCHLOROthiazide (MICROZIDE) 12.5 MG capsule Take 1 capsule by mouth every morning      metoprolol succinate (TOPROL XL) 100 MG extended release tablet       pravastatin (PRAVACHOL) 40 MG tablet Take 1 tablet by mouth daily      tiZANidine (ZANAFLEX) 2 MG tablet daily       Melatonin 10 MG TABS Take by mouth as needed       diphenhydrAMINE (BENADRYL) 50 MG capsule Take 1 capsule by mouth every 6 hours as needed for Itching      vitamin B-12 (CYANOCOBALAMIN) 1000 MCG tablet Take 1 tablet by mouth daily      finasteride (PROPECIA) 1 MG tablet Take 1 tablet by mouth daily      glucosamine-chondroitin 500-400 MG tablet Take 1 tablet by mouth 2 times daily      aspirin 81 MG chewable tablet Take 1 tablet by mouth daily      famotidine (PEPCID) 40 MG

## 2024-05-09 NOTE — PATIENT INSTRUCTIONS
SURVEY:    You may be receiving a survey from [facility] regarding your visit today.    You may get this in the mail, through your MyChart, or in your email.     Please complete the survey to enable us to provide the highest quality of care to you and your family.    If you cannot score us a very good (5 Stars) on any question, please call the office to discuss how we could of made your experience exceptional.    Thank you!    MD Marcia Jackman, APRN-WILIAN Argueta, SHIMON Kelly LPN Brenda Boehler, LPN Jena Adams, MA    Phone: [phone]  Fax: [phone]    Office Hours:   M-TH 8-5, F: 8-12

## 2024-05-10 NOTE — PROGRESS NOTES
Pathology from [facility] form 3/5/24 - Minimal gastritis, otherwise no significant findings. Still need his operative report for his previous umbilical hernia repair.

## 2024-05-10 NOTE — TELEPHONE ENCOUNTER
[facility] medical records called to request patient information. Epic also updated with information requested. Information noted under encounters.